# Patient Record
(demographics unavailable — no encounter records)

---

## 2024-12-18 NOTE — BEGINNING OF VISIT
[0] : 2) Feeling down, depressed, or hopeless: Not at all (0) [Pain Scale: ___] : On a scale of 1-10, today the patient's pain is a(n) [unfilled]. [Never] : Never [Date Discussed (MM/DD/YY): ___] : Discussed: [unfilled] [With Patient/Caregiver] : with Patient/Caregiver [Diarrhea Character] : Diarrhea: Grade 0 [[id number]] : 0 [Abdominal Pain] : no abdominal pain [Vomiting] : no vomiting [Constipation] : no constipation

## 2024-12-18 NOTE — BEGINNING OF VISIT
[0] : 2) Feeling down, depressed, or hopeless: Not at all (0) [Pain Scale: ___] : On a scale of 1-10, today the patient's pain is a(n) [unfilled]. [Never] : Never [Date Discussed (MM/DD/YY): ___] : Discussed: [unfilled] [With Patient/Caregiver] : with Patient/Caregiver [Diarrhea Character] : Diarrhea: Grade 0 [[id number]] : 0 [Abdominal Pain] : no abdominal pain [Vomiting] : no vomiting [Constipation] : no constipation

## 2024-12-18 NOTE — BEGINNING OF VISIT
[0] : 2) Feeling down, depressed, or hopeless: Not at all (0) [Pain Scale: ___] : On a scale of 1-10, today the patient's pain is a(n) [unfilled]. [Never] : Never [Date Discussed (MM/DD/YY): ___] : Discussed: [unfilled] [With Patient/Caregiver] : with Patient/Caregiver [Diarrhea Character] : Diarrhea: Grade 0 [[id number]] : 0 [Abdominal Pain] : no abdominal pain [Vomiting] : no vomiting [Constipation] : no constipation

## 2024-12-18 NOTE — BEGINNING OF VISIT
[0] : 2) Feeling down, depressed, or hopeless: Not at all (0) [Pain Scale: ___] : On a scale of 1-10, today the patient's pain is a(n) [unfilled]. [Never] : Never [Date Discussed (MM/DD/YY): ___] : Discussed: [unfilled] [With Patient/Caregiver] : with Patient/Caregiver [Diarrhea Character] : Diarrhea: Grade 0 [[id number]] : 0 [Abdominal Pain] : no abdominal pain [Vomiting] : no vomiting [Constipation] : no constipation

## 2024-12-19 NOTE — PHYSICAL EXAM
[Fully active, able to carry on all pre-disease performance without restriction] : Status 0 - Fully active, able to carry on all pre-disease performance without restriction [Normal] : affect appropriate [de-identified] : rt unremarkable, s/p Lt wle/RT, no palp mass or LN

## 2024-12-19 NOTE — HISTORY OF PRESENT ILLNESS
[de-identified] : Pt has significant family hx of breast and pancreatic cancer, BRCA neg as well as myRisk panel.  Routine bilat mammo, sono done -, core bx ILC classic features, mod diff grade 2 (3, 2, 1), 0.8 cm, no LVI or in-situ carcinoma identified, ER 90%, PA 75%, Ki-67 15%, and HER-2/ney neg.  Breast MRI 20, Rt unremarkable, Lt bx-proven carcinoma 11 o'clock axis with 2.8 cm non-mass enhancement anterior to the index mass, no abnormal LN.   WLE and SLN, 2 SLN neg, ILC classic and pleomorphic types with intermediate-to-high nuclear grade, 2.5 cm, no LVI, LCIS classic and florid types with intermediate grade nuclei, final margins were neg.  ORS 18. s/p RT. anastrazole 10/2020 - no signif SE. 23 Bilat MMG/US - ARTI. S/p Prolia #3. BMD 3/24 with improvement. Up to date dentist  ALLERGIES: NKA.  PMH: Menarche 13, G2, P0, AB2, LMP 50.   HRT since age 51, DCed at diagnosis.  OCP in past.  Hx anxiety, colonoscopy 5 Y ago, BMD 3/24ago, GYN 3/24 up-to-date with flu vax and pneumonia vax.  No significant medical illnesses.  DJD hands, knees, torn left meniscus.  FAMILY HX: My risk neg  descent, mother 90, had uterine cancer at 44, breast cancer at 68, had 3 brothers, 1  of pancreatic cancer in his 60s and other lung cancer, and 1 sister had breast cancer at 44.  A male 1st cousin maternal had pancreatic cancer in his 50s.  Father  ASHD, CVA 48, had 1 sister and 1 half brother.  Pt has 2 brothers, 1 diagnosed with neuroendocrine cancer head and neck, 68.  MyRisk profile neg.  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  Lives with significant other, heterosexual, not active, exercises regularly.   [de-identified] : 12/18/24 am cough - exess phlegm, allergies occ hot flush urine freq - on diuretic djd wrists, knees stable gyn 3/24 colonoscopy 7/24 - 1 polyp f/u 5 y wt bearing 30min 3/wk, walks 1 drink/d occ more getting prolia this week up to date dentist 3/24 BMD reportedly improved - follwed by endocrine 6/28/24 bilat mmg/us - annual f/u

## 2024-12-19 NOTE — HISTORY OF PRESENT ILLNESS
[de-identified] : Pt has significant family hx of breast and pancreatic cancer, BRCA neg as well as myRisk panel.  Routine bilat mammo, sono done -, core bx ILC classic features, mod diff grade 2 (3, 2, 1), 0.8 cm, no LVI or in-situ carcinoma identified, ER 90%, NE 75%, Ki-67 15%, and HER-2/ney neg.  Breast MRI 20, Rt unremarkable, Lt bx-proven carcinoma 11 o'clock axis with 2.8 cm non-mass enhancement anterior to the index mass, no abnormal LN.   WLE and SLN, 2 SLN neg, ILC classic and pleomorphic types with intermediate-to-high nuclear grade, 2.5 cm, no LVI, LCIS classic and florid types with intermediate grade nuclei, final margins were neg.  ORS 18. s/p RT. anastrazole 10/2020 - no signif SE. 23 Bilat MMG/US - ARTI. S/p Prolia #3. BMD 3/24 with improvement. Up to date dentist  ALLERGIES: NKA.  PMH: Menarche 13, G2, P0, AB2, LMP 50.   HRT since age 51, DCed at diagnosis.  OCP in past.  Hx anxiety, colonoscopy 5 Y ago, BMD 3/24ago, GYN 3/24 up-to-date with flu vax and pneumonia vax.  No significant medical illnesses.  DJD hands, knees, torn left meniscus.  FAMILY HX: My risk neg  descent, mother 90, had uterine cancer at 44, breast cancer at 68, had 3 brothers, 1  of pancreatic cancer in his 60s and other lung cancer, and 1 sister had breast cancer at 44.  A male 1st cousin maternal had pancreatic cancer in his 50s.  Father  ASHD, CVA 48, had 1 sister and 1 half brother.  Pt has 2 brothers, 1 diagnosed with neuroendocrine cancer head and neck, 68.  MyRisk profile neg.  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  Lives with significant other, heterosexual, not active, exercises regularly.   [de-identified] : 12/18/24 am cough - exess phlegm, allergies occ hot flush urine freq - on diuretic djd wrists, knees stable gyn 3/24 colonoscopy 7/24 - 1 polyp f/u 5 y wt bearing 30min 3/wk, walks 1 drink/d occ more getting prolia this week up to date dentist 3/24 BMD reportedly improved - follwed by endocrine 6/28/24 bilat mmg/us - annual f/u

## 2024-12-19 NOTE — REVIEW OF SYSTEMS
[Diarrhea: Grade 0] : Diarrhea: Grade 0 [Hot Flashes] : hot flashes [Negative] : Allergic/Immunologic [de-identified] : occasional mild hot flashes

## 2024-12-19 NOTE — ASSESSMENT
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 ORS 18 . Cont anastrazole 10/20- to continue. Sp  prolia #4 . Sched Bilat MMG/US 6/25. Reviewed diet and exercise. Reviewed BMD - improved Labs as per endocrine - prolia this week. Has health proxy. F/U 6 month [AdvancecareDate] : 12/28/24

## 2024-12-19 NOTE — REVIEW OF SYSTEMS
[Diarrhea: Grade 0] : Diarrhea: Grade 0 [Hot Flashes] : hot flashes [Negative] : Allergic/Immunologic [de-identified] : occasional mild hot flashes

## 2024-12-19 NOTE — ASSESSMENT
[With Patient/Caregiver] : With Patient/Caregiver [Designated Health Care Proxy] : Designated Health Care Proxy [Name: ___] : Name: [unfilled] [Relationship: ___] : Relationship: [unfilled] [FreeTextEntry1] : BRCA neg, myRisk profile neg, 71-yo with significant family Hx  breast, pancreatic cancer (no 1st degree), and possibly uterine cancer, presents with 2.5 cm classic and pleomorphic invasive lobular carcinoma with associated LCIS, ER/CA pos, HER-2 neg, 2 LN neg. ORS 18 . Cont anastrazole 10/20- to continue. Sp  prolia #4 . Sched Bilat MMG/US 6/25. Reviewed diet and exercise. Reviewed BMD - improved Labs as per endocrine - prolia this week. Has health proxy. F/U 6 month [AdvancecareDate] : 12/28/24

## 2024-12-19 NOTE — PHYSICAL EXAM
[Fully active, able to carry on all pre-disease performance without restriction] : Status 0 - Fully active, able to carry on all pre-disease performance without restriction [Normal] : affect appropriate [de-identified] : rt unremarkable, s/p Lt wle/RT, no palp mass or LN

## 2024-12-19 NOTE — HISTORY OF PRESENT ILLNESS
[de-identified] : Pt has significant family hx of breast and pancreatic cancer, BRCA neg as well as myRisk panel.  Routine bilat mammo, sono done -, core bx ILC classic features, mod diff grade 2 (3, 2, 1), 0.8 cm, no LVI or in-situ carcinoma identified, ER 90%, ID 75%, Ki-67 15%, and HER-2/ney neg.  Breast MRI 20, Rt unremarkable, Lt bx-proven carcinoma 11 o'clock axis with 2.8 cm non-mass enhancement anterior to the index mass, no abnormal LN.   WLE and SLN, 2 SLN neg, ILC classic and pleomorphic types with intermediate-to-high nuclear grade, 2.5 cm, no LVI, LCIS classic and florid types with intermediate grade nuclei, final margins were neg.  ORS 18. s/p RT. anastrazole 10/2020 - no signif SE. 23 Bilat MMG/US - ARTI. S/p Prolia #3. BMD 3/24 with improvement. Up to date dentist  ALLERGIES: NKA.  PMH: Menarche 13, G2, P0, AB2, LMP 50.   HRT since age 51, DCed at diagnosis.  OCP in past.  Hx anxiety, colonoscopy 5 Y ago, BMD 3/24ago, GYN 3/24 up-to-date with flu vax and pneumonia vax.  No significant medical illnesses.  DJD hands, knees, torn left meniscus.  FAMILY HX: My risk neg  descent, mother 90, had uterine cancer at 44, breast cancer at 68, had 3 brothers, 1  of pancreatic cancer in his 60s and other lung cancer, and 1 sister had breast cancer at 44.  A male 1st cousin maternal had pancreatic cancer in his 50s.  Father  ASHD, CVA 48, had 1 sister and 1 half brother.  Pt has 2 brothers, 1 diagnosed with neuroendocrine cancer head and neck, 68.  MyRisk profile neg.  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  Lives with significant other, heterosexual, not active, exercises regularly.   [de-identified] : 12/18/24 am cough - exess phlegm, allergies occ hot flush urine freq - on diuretic djd wrists, knees stable gyn 3/24 colonoscopy 7/24 - 1 polyp f/u 5 y wt bearing 30min 3/wk, walks 1 drink/d occ more getting prolia this week up to date dentist 3/24 BMD reportedly improved - follwed by endocrine 6/28/24 bilat mmg/us - annual f/u

## 2024-12-19 NOTE — ASSESSMENT
[With Patient/Caregiver] : With Patient/Caregiver [Designated Health Care Proxy] : Designated Health Care Proxy [Name: ___] : Name: [unfilled] [Relationship: ___] : Relationship: [unfilled] [FreeTextEntry1] : BRCA neg, myRisk profile neg, 71-yo with significant family Hx  breast, pancreatic cancer (no 1st degree), and possibly uterine cancer, presents with 2.5 cm classic and pleomorphic invasive lobular carcinoma with associated LCIS, ER/[place] pos, HER-2 neg, 2 LN neg. ORS 18 . Cont anastrazole 10/20- to continue. Sp  prolia #4 . Sched Bilat MMG/US 6/25. Reviewed diet and exercise. Reviewed BMD - improved Labs as per endocrine - prolia this week. Has health proxy. F/U 6 month [AdvancecareDate] : 12/28/24

## 2024-12-19 NOTE — ASSESSMENT
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 ORS 18 . Cont anastrazole 10/20- to continue. Sp  prolia #4 . Sched Bilat MMG/US 6/25. Reviewed diet and exercise. Reviewed BMD - improved Labs as per endocrine - prolia this week. Has health proxy. F/U 6 month [AdvancecareDate] : 12/28/24

## 2024-12-19 NOTE — HISTORY OF PRESENT ILLNESS
[de-identified] : Pt has significant family hx of breast and pancreatic cancer, BRCA neg as well as myRisk panel.  Routine bilat mammo, sono done -, core bx ILC classic features, mod diff grade 2 (3, 2, 1), 0.8 cm, no LVI or in-situ carcinoma identified, ER 90%, SD 75%, Ki-67 15%, and HER-2/ney neg.  Breast MRI 20, Rt unremarkable, Lt bx-proven carcinoma 11 o'clock axis with 2.8 cm non-mass enhancement anterior to the index mass, no abnormal LN.   WLE and SLN, 2 SLN neg, ILC classic and pleomorphic types with intermediate-to-high nuclear grade, 2.5 cm, no LVI, LCIS classic and florid types with intermediate grade nuclei, final margins were neg.  ORS 18. s/p RT. anastrazole 10/2020 - no signif SE. 23 Bilat MMG/US - ARTI. S/p Prolia #3. BMD 3/24 with improvement. Up to date dentist  ALLERGIES: NKA.  PMH: Menarche 13, G2, P0, AB2, LMP 50.   HRT since age 51, DCed at diagnosis.  OCP in past.  Hx anxiety, colonoscopy 5 Y ago, BMD 3/24ago, GYN 3/24 up-to-date with flu vax and pneumonia vax.  No significant medical illnesses.  DJD hands, knees, torn left meniscus.  FAMILY HX: My risk neg  descent, mother 90, had uterine cancer at 44, breast cancer at 68, had 3 brothers, 1  of pancreatic cancer in his 60s and other lung cancer, and 1 sister had breast cancer at 44.  A male 1st cousin maternal had pancreatic cancer in his 50s.  Father  ASHD, CVA 48, had 1 sister and 1 half brother.  Pt has 2 brothers, 1 diagnosed with neuroendocrine cancer head and neck, 68.  MyRisk profile neg.  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  Lives with significant other, heterosexual, not active, exercises regularly.   [de-identified] : 12/18/24 am cough - exess phlegm, allergies occ hot flush urine freq - on diuretic djd wrists, knees stable gyn 3/24 colonoscopy 7/24 - 1 polyp f/u 5 y wt bearing 30min 3/wk, walks 1 drink/d occ more getting prolia this week up to date dentist 3/24 BMD reportedly improved - follwed by endocrine 6/28/24 bilat mmg/us - annual f/u

## 2024-12-19 NOTE — PHYSICAL EXAM
[Fully active, able to carry on all pre-disease performance without restriction] : Status 0 - Fully active, able to carry on all pre-disease performance without restriction [Normal] : affect appropriate [de-identified] : rt unremarkable, s/p Lt wle/RT, no palp mass or LN

## 2024-12-19 NOTE — REVIEW OF SYSTEMS
[Diarrhea: Grade 0] : Diarrhea: Grade 0 [Hot Flashes] : hot flashes [Negative] : Allergic/Immunologic [de-identified] : occasional mild hot flashes

## 2024-12-19 NOTE — REVIEW OF SYSTEMS
[Diarrhea: Grade 0] : Diarrhea: Grade 0 [Hot Flashes] : hot flashes [Negative] : Allergic/Immunologic [de-identified] : occasional mild hot flashes

## 2024-12-19 NOTE — PHYSICAL EXAM
[Fully active, able to carry on all pre-disease performance without restriction] : Status 0 - Fully active, able to carry on all pre-disease performance without restriction [Normal] : affect appropriate [de-identified] : rt unremarkable, s/p Lt wle/RT, no palp mass or LN

## 2025-07-08 NOTE — HISTORY OF PRESENT ILLNESS
[de-identified] : Pt has significant family hx of breast and pancreatic cancer, BRCA neg as well as myRisk panel.  Routine bilat mammo, sono done -, core bx ILC classic features, mod diff grade 2 (3, 2, 1), 0.8 cm, no LVI or in-situ carcinoma identified, ER 90%, TN 75%, Ki-67 15%, and HER-2/ney neg.  Breast MRI 20, Rt unremarkable, Lt bx-proven carcinoma 11 o'clock axis with 2.8 cm non-mass enhancement anterior to the index mass, no abnormal LN.   WLE and SLN, 2 SLN neg, ILC classic and pleomorphic types with intermediate-to-high nuclear grade, 2.5 cm, no LVI, LCIS classic and florid types with intermediate grade nuclei, final margins were neg.  ORS 18. s/p RT. anastrazole 10/2020 - no signif SE. 23 Bilat MMG/US - ARTI. S/p Prolia #3. BMD 3/24 with improvement. Up to date dentist  ALLERGIES: NKA.  PMH: Menarche 13, G2, P0, AB2, LMP 50.   HRT since age 51, DCed at diagnosis.  OCP in past.  Hx anxiety, colonoscopy 5 Y ago, BMD 3/24ago, GYN 3/24 up-to-date with flu vax and pneumonia vax.  No significant medical illnesses.  DJD hands, knees, torn left meniscus.  FAMILY HX: My risk neg  descent, mother 90, had uterine cancer at 44, breast cancer at 68, had 3 brothers, 1  of pancreatic cancer in his 60s and other lung cancer, and 1 sister had breast cancer at 44.  A male 1st cousin maternal had pancreatic cancer in his 50s.  Father  ASHD, CVA 48, had 1 sister and 1 half brother.  Pt has 2 brothers, 1 diagnosed with neuroendocrine cancer head and neck, 68.  MyRisk profile neg.  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  Lives with significant other, heterosexual, not active, exercises regularly.   [de-identified] : 12/18/24 am cough - exess phlegm, allergies occ hot flush urine freq - on diuretic djd wrists, knees stable gyn 3/24 colonoscopy 7/24 - 1 polyp f/u 5 y wt bearing 30min 3/wk, walks 1 drink/d occ more getting prolia this week up to date dentist 3/24 BMD reportedly improved - follwed by endocrine 6/28/24 bilat mmg/us - annual f/u  6/18/25 - rescheduled  7/8/25 BMD 3/22/24 @ [facility] - Lt forearm/lumbar spine normal, Lt hip osteopenia, Prolia (Jubbonti) q6months, last dose 6/7/25, managed by endocrine MMG/US 6/30/25 @ [place] - javid, annual f/u continues anastrozole (started 10/2020) - tolerating well vaginal dryness - Vagifem tabs colonoscopy 7/2024 - polyp removed, f/u 5 yrs gyn 3/25 - wnl, pap smear normal up to date dentist walks in park once a week, weight-bearing exercises 3x per week 1-1.25 drinks per day

## 2025-07-08 NOTE — REVIEW OF SYSTEMS
[Diarrhea: Grade 0] : Diarrhea: Grade 0 [Hot Flashes] : hot flashes [Negative] : Allergic/Immunologic [de-identified] : occasional mild hot flashes

## 2025-07-08 NOTE — ASSESSMENT
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 ORS 18 . Cont anastrazole 10/20- to continue. Sp  prolia #5 .  Bilat MMG/US 6/26. Reviewed diet and exercise. Reviewed BMD - improved .Labs as per endocrine. Has health proxy. F/U 6 month [AdvancecareDate] : 7/8/25

## 2025-07-08 NOTE — ASSESSMENT
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 ORS 18 . Cont anastrazole 10/20- to continue. Sp  prolia #5 .  Bilat MMG/US 6/26. Reviewed diet and exercise. Reviewed BMD - improved .Labs as per endocrine. Has health proxy. F/U 6 month [AdvancecareDate] : 7/8/25

## 2025-07-08 NOTE — ASSESSMENT
[With Patient/Caregiver] : With Patient/Caregiver [Name: ___] : Name: [unfilled] [Designated Health Care Proxy] : Designated Health Care Proxy [Relationship: ___] : Relationship: [unfilled] [FreeTextEntry1] : BRCA neg, myRisk profile neg, 71-yo with significant family Hx  breast, pancreatic cancer (no 1st degree), and possibly uterine cancer, presents with 2.5 cm classic and pleomorphic invasive lobular carcinoma with associated LCIS, ER/[place] pos, HER-2 neg, 2 LN neg. ORS 18 . Cont anastrazole 10/20- to continue. Sp  prolia #5 .  Bilat MMG/US 6/26. Reviewed diet and exercise. Reviewed BMD - improved .Labs as per endocrine. Has health proxy. F/U 6 month [AdvancecareDate] : 7/8/25

## 2025-07-08 NOTE — HISTORY OF PRESENT ILLNESS
[de-identified] : Pt has significant family hx of breast and pancreatic cancer, BRCA neg as well as myRisk panel.  Routine bilat mammo, sono done -, core bx ILC classic features, mod diff grade 2 (3, 2, 1), 0.8 cm, no LVI or in-situ carcinoma identified, ER 90%, [place] 75%, Ki-67 15%, and HER-2/ney neg.  Breast MRI 20, Rt unremarkable, Lt bx-proven carcinoma 11 o'clock axis with 2.8 cm non-mass enhancement anterior to the index mass, no abnormal LN.   WLE and SLN, 2 SLN neg, ILC classic and pleomorphic types with intermediate-to-high nuclear grade, 2.5 cm, no LVI, LCIS classic and florid types with intermediate grade nuclei, final margins were neg.  ORS 18. s/p RT. anastrazole 10/2020 - no signif SE. 23 Bilat MMG/US - ARTI. S/p Prolia #3. BMD 3/24 with improvement. Up to date dentist  ALLERGIES: NKA.  PMH: Menarche 13, G2, P0, AB2, LMP 50.   HRT since age 51, DCed at diagnosis.  OCP in past.  Hx anxiety, colonoscopy 5 Y ago, BMD 3/24ago, GYN 3/24 up-to-date with flu vax and pneumonia vax.  No significant medical illnesses.  DJD hands, knees, torn left meniscus.  FAMILY HX: My risk neg  descent, mother 90, had uterine cancer at 44, breast cancer at 68, had 3 brothers, 1  of pancreatic cancer in his 60s and other lung cancer, and 1 sister had breast cancer at 44.  A male 1st cousin maternal had pancreatic cancer in his 50s.  Father  ASHD, CVA 48, had 1 sister and 1 half brother.  Pt has 2 brothers, 1 diagnosed with neuroendocrine cancer head and neck, 68.  MyRisk profile neg.  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  Lives with significant other, heterosexual, not active, exercises regularly.   [de-identified] : 12/18/24 am cough - exess phlegm, allergies occ hot flush urine freq - on diuretic djd wrists, knees stable gyn 3/24 colonoscopy 7/24 - 1 polyp f/u 5 y wt bearing 30min 3/wk, walks 1 drink/d occ more getting prolia this week up to date dentist 3/24 BMD reportedly improved - follwed by endocrine 6/28/24 bilat mmg/us - annual f/u  6/18/25 - rescheduled  7/8/25 BMD 3/22/24 @ [facility] - Lt forearm/lumbar spine normal, Lt hip osteopenia, Prolia (Jubbonti) q6months, last dose 6/7/25, managed by endocrine MMG/US 6/30/25 @ [place] - javid, annual f/u continues anastrozole (started 10/2020) - tolerating well vaginal dryness - Vagifem tabs colonoscopy 7/2024 - polyp removed, f/u 5 yrs gyn 3/25 - wnl, pap smear normal up to date dentist walks in park once a week, weight-bearing exercises 3x per week 1-1.25 drinks per day

## 2025-07-08 NOTE — ASSESSMENT
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 ORS 18 . Cont anastrazole 10/20- to continue. Sp  prolia #5 .  Bilat MMG/US 6/26. Reviewed diet and exercise. Reviewed BMD - improved .Labs as per endocrine. Has health proxy. F/U 6 month [AdvancecareDate] : 7/8/25

## 2025-07-08 NOTE — BEGINNING OF VISIT
[0] : 2) Feeling down, depressed, or hopeless: Not at all (0) [Never] : Never [Date Discussed (MM/DD/YY): ___] : Discussed: [unfilled] [With Patient/Caregiver] : with Patient/Caregiver

## 2025-07-08 NOTE — REVIEW OF SYSTEMS
[Diarrhea: Grade 0] : Diarrhea: Grade 0 [Hot Flashes] : hot flashes [Negative] : Allergic/Immunologic [de-identified] : occasional mild hot flashes

## 2025-07-08 NOTE — REVIEW OF SYSTEMS
[Diarrhea: Grade 0] : Diarrhea: Grade 0 [Hot Flashes] : hot flashes [Negative] : Allergic/Immunologic [de-identified] : occasional mild hot flashes

## 2025-07-08 NOTE — PHYSICAL EXAM
[Fully active, able to carry on all pre-disease performance without restriction] : Status 0 - Fully active, able to carry on all pre-disease performance without restriction [Normal] : affect appropriate [de-identified] : rt unremarkable, s/p Lt wle/RT, no palp mass or LN

## 2025-07-08 NOTE — HISTORY OF PRESENT ILLNESS
[de-identified] : Pt has significant family hx of breast and pancreatic cancer, BRCA neg as well as myRisk panel.  Routine bilat mammo, sono done -, core bx ILC classic features, mod diff grade 2 (3, 2, 1), 0.8 cm, no LVI or in-situ carcinoma identified, ER 90%, MD 75%, Ki-67 15%, and HER-2/ney neg.  Breast MRI 20, Rt unremarkable, Lt bx-proven carcinoma 11 o'clock axis with 2.8 cm non-mass enhancement anterior to the index mass, no abnormal LN.   WLE and SLN, 2 SLN neg, ILC classic and pleomorphic types with intermediate-to-high nuclear grade, 2.5 cm, no LVI, LCIS classic and florid types with intermediate grade nuclei, final margins were neg.  ORS 18. s/p RT. anastrazole 10/2020 - no signif SE. 23 Bilat MMG/US - ARTI. S/p Prolia #3. BMD 3/24 with improvement. Up to date dentist  ALLERGIES: NKA.  PMH: Menarche 13, G2, P0, AB2, LMP 50.   HRT since age 51, DCed at diagnosis.  OCP in past.  Hx anxiety, colonoscopy 5 Y ago, BMD 3/24ago, GYN 3/24 up-to-date with flu vax and pneumonia vax.  No significant medical illnesses.  DJD hands, knees, torn left meniscus.  FAMILY HX: My risk neg  descent, mother 90, had uterine cancer at 44, breast cancer at 68, had 3 brothers, 1  of pancreatic cancer in his 60s and other lung cancer, and 1 sister had breast cancer at 44.  A male 1st cousin maternal had pancreatic cancer in his 50s.  Father  ASHD, CVA 48, had 1 sister and 1 half brother.  Pt has 2 brothers, 1 diagnosed with neuroendocrine cancer head and neck, 68.  MyRisk profile neg.  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  Lives with significant other, heterosexual, not active, exercises regularly.   [de-identified] : 12/18/24 am cough - exess phlegm, allergies occ hot flush urine freq - on diuretic djd wrists, knees stable gyn 3/24 colonoscopy 7/24 - 1 polyp f/u 5 y wt bearing 30min 3/wk, walks 1 drink/d occ more getting prolia this week up to date dentist 3/24 BMD reportedly improved - follwed by endocrine 6/28/24 bilat mmg/us - annual f/u  6/18/25 - rescheduled  7/8/25 BMD 3/22/24 @ [facility] - Lt forearm/lumbar spine normal, Lt hip osteopenia, Prolia (Jubbonti) q6months, last dose 6/7/25, managed by endocrine MMG/US 6/30/25 @ [place] - javid, annual f/u continues anastrozole (started 10/2020) - tolerating well vaginal dryness - Vagifem tabs colonoscopy 7/2024 - polyp removed, f/u 5 yrs gyn 3/25 - wnl, pap smear normal up to date dentist walks in park once a week, weight-bearing exercises 3x per week 1-1.25 drinks per day

## 2025-07-08 NOTE — HISTORY OF PRESENT ILLNESS
[de-identified] : Pt has significant family hx of breast and pancreatic cancer, BRCA neg as well as myRisk panel.  Routine bilat mammo, sono done -, core bx ILC classic features, mod diff grade 2 (3, 2, 1), 0.8 cm, no LVI or in-situ carcinoma identified, ER 90%, MS 75%, Ki-67 15%, and HER-2/ney neg.  Breast MRI 20, Rt unremarkable, Lt bx-proven carcinoma 11 o'clock axis with 2.8 cm non-mass enhancement anterior to the index mass, no abnormal LN.   WLE and SLN, 2 SLN neg, ILC classic and pleomorphic types with intermediate-to-high nuclear grade, 2.5 cm, no LVI, LCIS classic and florid types with intermediate grade nuclei, final margins were neg.  ORS 18. s/p RT. anastrazole 10/2020 - no signif SE. 23 Bilat MMG/US - ARTI. S/p Prolia #3. BMD 3/24 with improvement. Up to date dentist  ALLERGIES: NKA.  PMH: Menarche 13, G2, P0, AB2, LMP 50.   HRT since age 51, DCed at diagnosis.  OCP in past.  Hx anxiety, colonoscopy 5 Y ago, BMD 3/24ago, GYN 3/24 up-to-date with flu vax and pneumonia vax.  No significant medical illnesses.  DJD hands, knees, torn left meniscus.  FAMILY HX: My risk neg  descent, mother 90, had uterine cancer at 44, breast cancer at 68, had 3 brothers, 1  of pancreatic cancer in his 60s and other lung cancer, and 1 sister had breast cancer at 44.  A male 1st cousin maternal had pancreatic cancer in his 50s.  Father  ASHD, CVA 48, had 1 sister and 1 half brother.  Pt has 2 brothers, 1 diagnosed with neuroendocrine cancer head and neck, 68.  MyRisk profile neg.  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  Lives with significant other, heterosexual, not active, exercises regularly.   [de-identified] : 12/18/24 am cough - exess phlegm, allergies occ hot flush urine freq - on diuretic djd wrists, knees stable gyn 3/24 colonoscopy 7/24 - 1 polyp f/u 5 y wt bearing 30min 3/wk, walks 1 drink/d occ more getting prolia this week up to date dentist 3/24 BMD reportedly improved - follwed by endocrine 6/28/24 bilat mmg/us - annual f/u  6/18/25 - rescheduled  7/8/25 BMD 3/22/24 @ [facility] - Lt forearm/lumbar spine normal, Lt hip osteopenia, Prolia (Jubbonti) q6months, last dose 6/7/25, managed by endocrine MMG/US 6/30/25 @ [place] - javid, annual f/u continues anastrozole (started 10/2020) - tolerating well vaginal dryness - Vagifem tabs colonoscopy 7/2024 - polyp removed, f/u 5 yrs gyn 3/25 - wnl, pap smear normal up to date dentist walks in park once a week, weight-bearing exercises 3x per week 1-1.25 drinks per day

## 2025-07-08 NOTE — PHYSICAL EXAM
[Fully active, able to carry on all pre-disease performance without restriction] : Status 0 - Fully active, able to carry on all pre-disease performance without restriction [Normal] : affect appropriate [de-identified] : rt unremarkable, s/p Lt wle/RT, no palp mass or LN

## 2025-07-08 NOTE — ASSESSMENT
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 ORS 18 . Cont anastrazole 10/20- to continue. Sp  prolia #5 .  Bilat MMG/US 6/26. Reviewed diet and exercise. Reviewed BMD - improved .Labs as per endocrine. Has health proxy. F/U 6 month [AdvancecareDate] : 7/8/25

## 2025-07-08 NOTE — PHYSICAL EXAM
[Fully active, able to carry on all pre-disease performance without restriction] : Status 0 - Fully active, able to carry on all pre-disease performance without restriction [Normal] : affect appropriate [de-identified] : rt unremarkable, s/p Lt wle/RT, no palp mass or LN

## 2025-07-08 NOTE — HISTORY OF PRESENT ILLNESS
[de-identified] : Pt has significant family hx of breast and pancreatic cancer, BRCA neg as well as myRisk panel.  Routine bilat mammo, sono done -, core bx ILC classic features, mod diff grade 2 (3, 2, 1), 0.8 cm, no LVI or in-situ carcinoma identified, ER 90%, MT 75%, Ki-67 15%, and HER-2/ney neg.  Breast MRI 20, Rt unremarkable, Lt bx-proven carcinoma 11 o'clock axis with 2.8 cm non-mass enhancement anterior to the index mass, no abnormal LN.   WLE and SLN, 2 SLN neg, ILC classic and pleomorphic types with intermediate-to-high nuclear grade, 2.5 cm, no LVI, LCIS classic and florid types with intermediate grade nuclei, final margins were neg.  ORS 18. s/p RT. anastrazole 10/2020 - no signif SE. 23 Bilat MMG/US - ARTI. S/p Prolia #3. BMD 3/24 with improvement. Up to date dentist  ALLERGIES: NKA.  PMH: Menarche 13, G2, P0, AB2, LMP 50.   HRT since age 51, DCed at diagnosis.  OCP in past.  Hx anxiety, colonoscopy 5 Y ago, BMD 3/24ago, GYN 3/24 up-to-date with flu vax and pneumonia vax.  No significant medical illnesses.  DJD hands, knees, torn left meniscus.  FAMILY HX: My risk neg  descent, mother 90, had uterine cancer at 44, breast cancer at 68, had 3 brothers, 1  of pancreatic cancer in his 60s and other lung cancer, and 1 sister had breast cancer at 44.  A male 1st cousin maternal had pancreatic cancer in his 50s.  Father  ASHD, CVA 48, had 1 sister and 1 half brother.  Pt has 2 brothers, 1 diagnosed with neuroendocrine cancer head and neck, 68.  MyRisk profile neg.  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  Lives with significant other, heterosexual, not active, exercises regularly.   [de-identified] : 12/18/24 am cough - exess phlegm, allergies occ hot flush urine freq - on diuretic djd wrists, knees stable gyn 3/24 colonoscopy 7/24 - 1 polyp f/u 5 y wt bearing 30min 3/wk, walks 1 drink/d occ more getting prolia this week up to date dentist 3/24 BMD reportedly improved - follwed by endocrine 6/28/24 bilat mmg/us - annual f/u  6/18/25 - rescheduled  7/8/25 BMD 3/22/24 @ [facility] - Lt forearm/lumbar spine normal, Lt hip osteopenia, Prolia (Jubbonti) q6months, last dose 6/7/25, managed by endocrine MMG/US 6/30/25 @ [place] - javid, annual f/u continues anastrozole (started 10/2020) - tolerating well vaginal dryness - Vagifem tabs colonoscopy 7/2024 - polyp removed, f/u 5 yrs gyn 3/25 - wnl, pap smear normal up to date dentist walks in park once a week, weight-bearing exercises 3x per week 1-1.25 drinks per day

## 2025-07-08 NOTE — PHYSICAL EXAM
[Fully active, able to carry on all pre-disease performance without restriction] : Status 0 - Fully active, able to carry on all pre-disease performance without restriction [Normal] : affect appropriate [de-identified] : rt unremarkable, s/p Lt wle/RT, no palp mass or LN

## 2025-07-08 NOTE — REVIEW OF SYSTEMS
[Diarrhea: Grade 0] : Diarrhea: Grade 0 [Hot Flashes] : hot flashes [Negative] : Allergic/Immunologic [de-identified] : occasional mild hot flashes

## 2025-07-08 NOTE — REVIEW OF SYSTEMS
[Diarrhea: Grade 0] : Diarrhea: Grade 0 [Hot Flashes] : hot flashes [Negative] : Allergic/Immunologic [de-identified] : occasional mild hot flashes

## 2025-07-08 NOTE — REVIEW OF SYSTEMS
[Diarrhea: Grade 0] : Diarrhea: Grade 0 [Hot Flashes] : hot flashes [Negative] : Allergic/Immunologic [de-identified] : occasional mild hot flashes

## 2025-07-08 NOTE — HISTORY OF PRESENT ILLNESS
[de-identified] : Pt has significant family hx of breast and pancreatic cancer, BRCA neg as well as myRisk panel.  Routine bilat mammo, sono done -, core bx ILC classic features, mod diff grade 2 (3, 2, 1), 0.8 cm, no LVI or in-situ carcinoma identified, ER 90%, ND 75%, Ki-67 15%, and HER-2/ney neg.  Breast MRI 20, Rt unremarkable, Lt bx-proven carcinoma 11 o'clock axis with 2.8 cm non-mass enhancement anterior to the index mass, no abnormal LN.   WLE and SLN, 2 SLN neg, ILC classic and pleomorphic types with intermediate-to-high nuclear grade, 2.5 cm, no LVI, LCIS classic and florid types with intermediate grade nuclei, final margins were neg.  ORS 18. s/p RT. anastrazole 10/2020 - no signif SE. 23 Bilat MMG/US - ARTI. S/p Prolia #3. BMD 3/24 with improvement. Up to date dentist  ALLERGIES: NKA.  PMH: Menarche 13, G2, P0, AB2, LMP 50.   HRT since age 51, DCed at diagnosis.  OCP in past.  Hx anxiety, colonoscopy 5 Y ago, BMD 3/24ago, GYN 3/24 up-to-date with flu vax and pneumonia vax.  No significant medical illnesses.  DJD hands, knees, torn left meniscus.  FAMILY HX: My risk neg  descent, mother 90, had uterine cancer at 44, breast cancer at 68, had 3 brothers, 1  of pancreatic cancer in his 60s and other lung cancer, and 1 sister had breast cancer at 44.  A male 1st cousin maternal had pancreatic cancer in his 50s.  Father  ASHD, CVA 48, had 1 sister and 1 half brother.  Pt has 2 brothers, 1 diagnosed with neuroendocrine cancer head and neck, 68.  MyRisk profile neg.  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  Lives with significant other, heterosexual, not active, exercises regularly.   [de-identified] : 12/18/24 am cough - exess phlegm, allergies occ hot flush urine freq - on diuretic djd wrists, knees stable gyn 3/24 colonoscopy 7/24 - 1 polyp f/u 5 y wt bearing 30min 3/wk, walks 1 drink/d occ more getting prolia this week up to date dentist 3/24 BMD reportedly improved - follwed by endocrine 6/28/24 bilat mmg/us - annual f/u  6/18/25 - rescheduled  7/8/25 BMD 3/22/24 @ [facility] - Lt forearm/lumbar spine normal, Lt hip osteopenia, Prolia (Jubbonti) q6months, last dose 6/7/25, managed by endocrine MMG/US 6/30/25 @ [place] - javid, annual f/u continues anastrozole (started 10/2020) - tolerating well vaginal dryness - Vagifem tabs colonoscopy 7/2024 - polyp removed, f/u 5 yrs gyn 3/25 - wnl, pap smear normal up to date dentist walks in park once a week, weight-bearing exercises 3x per week 1-1.25 drinks per day

## 2025-07-08 NOTE — HISTORY OF PRESENT ILLNESS
[de-identified] : Pt has significant family hx of breast and pancreatic cancer, BRCA neg as well as myRisk panel.  Routine bilat mammo, sono done -, core bx ILC classic features, mod diff grade 2 (3, 2, 1), 0.8 cm, no LVI or in-situ carcinoma identified, ER 90%, TX 75%, Ki-67 15%, and HER-2/ney neg.  Breast MRI 20, Rt unremarkable, Lt bx-proven carcinoma 11 o'clock axis with 2.8 cm non-mass enhancement anterior to the index mass, no abnormal LN.   WLE and SLN, 2 SLN neg, ILC classic and pleomorphic types with intermediate-to-high nuclear grade, 2.5 cm, no LVI, LCIS classic and florid types with intermediate grade nuclei, final margins were neg.  ORS 18. s/p RT. anastrazole 10/2020 - no signif SE. 23 Bilat MMG/US - ARTI. S/p Prolia #3. BMD 3/24 with improvement. Up to date dentist  ALLERGIES: NKA.  PMH: Menarche 13, G2, P0, AB2, LMP 50.   HRT since age 51, DCed at diagnosis.  OCP in past.  Hx anxiety, colonoscopy 5 Y ago, BMD 3/24ago, GYN 3/24 up-to-date with flu vax and pneumonia vax.  No significant medical illnesses.  DJD hands, knees, torn left meniscus.  FAMILY HX: My risk neg  descent, mother 90, had uterine cancer at 44, breast cancer at 68, had 3 brothers, 1  of pancreatic cancer in his 60s and other lung cancer, and 1 sister had breast cancer at 44.  A male 1st cousin maternal had pancreatic cancer in his 50s.  Father  ASHD, CVA 48, had 1 sister and 1 half brother.  Pt has 2 brothers, 1 diagnosed with neuroendocrine cancer head and neck, 68.  MyRisk profile neg.  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  Lives with significant other, heterosexual, not active, exercises regularly.   [de-identified] : 12/18/24 am cough - exess phlegm, allergies occ hot flush urine freq - on diuretic djd wrists, knees stable gyn 3/24 colonoscopy 7/24 - 1 polyp f/u 5 y wt bearing 30min 3/wk, walks 1 drink/d occ more getting prolia this week up to date dentist 3/24 BMD reportedly improved - follwed by endocrine 6/28/24 bilat mmg/us - annual f/u  6/18/25 - rescheduled  7/8/25 BMD 3/22/24 @ [facility] - Lt forearm/lumbar spine normal, Lt hip osteopenia, Prolia (Jubbonti) q6months, last dose 6/7/25, managed by endocrine MMG/US 6/30/25 @ [place] - javid, annual f/u continues anastrozole (started 10/2020) - tolerating well vaginal dryness - Vagifem tabs colonoscopy 7/2024 - polyp removed, f/u 5 yrs gyn 3/25 - wnl, pap smear normal up to date dentist walks in park once a week, weight-bearing exercises 3x per week 1-1.25 drinks per day

## 2025-07-08 NOTE — REVIEW OF SYSTEMS
[Diarrhea: Grade 0] : Diarrhea: Grade 0 [Hot Flashes] : hot flashes [Negative] : Allergic/Immunologic [de-identified] : occasional mild hot flashes  MED

## 2025-07-08 NOTE — PHYSICAL EXAM
[Fully active, able to carry on all pre-disease performance without restriction] : Status 0 - Fully active, able to carry on all pre-disease performance without restriction [Normal] : affect appropriate [de-identified] : rt unremarkable, s/p Lt wle/RT, no palp mass or LN

## 2025-07-08 NOTE — ASSESSMENT
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 ORS 18 . Cont anastrazole 10/20- to continue. Sp  prolia #5 .  Bilat MMG/US 6/26. Reviewed diet and exercise. Reviewed BMD - improved .Labs as per endocrine. Has health proxy. F/U 6 month [AdvancecareDate] : 7/8/25

## 2025-07-08 NOTE — HISTORY OF PRESENT ILLNESS
[de-identified] : Pt has significant family hx of breast and pancreatic cancer, BRCA neg as well as myRisk panel.  Routine bilat mammo, sono done -, core bx ILC classic features, mod diff grade 2 (3, 2, 1), 0.8 cm, no LVI or in-situ carcinoma identified, ER 90%, FL 75%, Ki-67 15%, and HER-2/ney neg.  Breast MRI 20, Rt unremarkable, Lt bx-proven carcinoma 11 o'clock axis with 2.8 cm non-mass enhancement anterior to the index mass, no abnormal LN.   WLE and SLN, 2 SLN neg, ILC classic and pleomorphic types with intermediate-to-high nuclear grade, 2.5 cm, no LVI, LCIS classic and florid types with intermediate grade nuclei, final margins were neg.  ORS 18. s/p RT. anastrazole 10/2020 - no signif SE. 23 Bilat MMG/US - ARTI. S/p Prolia #3. BMD 3/24 with improvement. Up to date dentist  ALLERGIES: NKA.  PMH: Menarche 13, G2, P0, AB2, LMP 50.   HRT since age 51, DCed at diagnosis.  OCP in past.  Hx anxiety, colonoscopy 5 Y ago, BMD 3/24ago, GYN 3/24 up-to-date with flu vax and pneumonia vax.  No significant medical illnesses.  DJD hands, knees, torn left meniscus.  FAMILY HX: My risk neg  descent, mother 90, had uterine cancer at 44, breast cancer at 68, had 3 brothers, 1  of pancreatic cancer in his 60s and other lung cancer, and 1 sister had breast cancer at 44.  A male 1st cousin maternal had pancreatic cancer in his 50s.  Father  ASHD, CVA 48, had 1 sister and 1 half brother.  Pt has 2 brothers, 1 diagnosed with neuroendocrine cancer head and neck, 68.  MyRisk profile neg.  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  Lives with significant other, heterosexual, not active, exercises regularly.   [de-identified] : 12/18/24 am cough - exess phlegm, allergies occ hot flush urine freq - on diuretic djd wrists, knees stable gyn 3/24 colonoscopy 7/24 - 1 polyp f/u 5 y wt bearing 30min 3/wk, walks 1 drink/d occ more getting prolia this week up to date dentist 3/24 BMD reportedly improved - follwed by endocrine 6/28/24 bilat mmg/us - annual f/u  6/18/25 - rescheduled  7/8/25 BMD 3/22/24 @ [facility] - Lt forearm/lumbar spine normal, Lt hip osteopenia, Prolia (Jubbonti) q6months, last dose 6/7/25, managed by endocrine MMG/US 6/30/25 @ [place] - javid, annual f/u continues anastrozole (started 10/2020) - tolerating well vaginal dryness - Vagifem tabs colonoscopy 7/2024 - polyp removed, f/u 5 yrs gyn 3/25 - wnl, pap smear normal up to date dentist walks in park once a week, weight-bearing exercises 3x per week 1-1.25 drinks per day

## 2025-07-08 NOTE — ASSESSMENT
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 ORS 18 . Cont anastrazole 10/20- to continue. Sp  prolia #5 .  Bilat MMG/US 6/26. Reviewed diet and exercise. Reviewed BMD - improved .Labs as per endocrine. Has health proxy. F/U 6 month [AdvancecareDate] : 7/8/25

## 2025-07-08 NOTE — PHYSICAL EXAM
[Fully active, able to carry on all pre-disease performance without restriction] : Status 0 - Fully active, able to carry on all pre-disease performance without restriction [Normal] : affect appropriate [de-identified] : rt unremarkable, s/p Lt wle/RT, no palp mass or LN

## 2025-07-08 NOTE — REVIEW OF SYSTEMS
[Diarrhea: Grade 0] : Diarrhea: Grade 0 [Hot Flashes] : hot flashes [Negative] : Allergic/Immunologic [de-identified] : occasional mild hot flashes

## 2025-07-08 NOTE — PHYSICAL EXAM
[Fully active, able to carry on all pre-disease performance without restriction] : Status 0 - Fully active, able to carry on all pre-disease performance without restriction [Normal] : affect appropriate [de-identified] : rt unremarkable, s/p Lt wle/RT, no palp mass or LN

## 2025-07-08 NOTE — PHYSICAL EXAM
[Fully active, able to carry on all pre-disease performance without restriction] : Status 0 - Fully active, able to carry on all pre-disease performance without restriction [Normal] : affect appropriate [de-identified] : rt unremarkable, s/p Lt wle/RT, no palp mass or LN

## 2025-07-08 NOTE — ASSESSMENT
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 ORS 18 . Cont anastrazole 10/20- to continue. Sp  prolia #5 .  Bilat MMG/US 6/26. Reviewed diet and exercise. Reviewed BMD - improved .Labs as per endocrine. Has health proxy. F/U 6 month [AdvancecareDate] : 7/8/25